# Patient Record
Sex: MALE | Race: WHITE | Employment: OTHER | URBAN - NONMETROPOLITAN AREA
[De-identification: names, ages, dates, MRNs, and addresses within clinical notes are randomized per-mention and may not be internally consistent; named-entity substitution may affect disease eponyms.]

---

## 2019-04-19 ENCOUNTER — HOSPITAL ENCOUNTER (EMERGENCY)
Age: 74
Discharge: HOME | End: 2019-04-19
Attending: EMERGENCY MEDICINE
Payer: COMMERCIAL

## 2019-04-19 VITALS
DIASTOLIC BLOOD PRESSURE: 61 MMHG | RESPIRATION RATE: 16 BRPM | OXYGEN SATURATION: 95 % | HEART RATE: 71 BPM | SYSTOLIC BLOOD PRESSURE: 111 MMHG | TEMPERATURE: 96.6 F

## 2019-04-19 DIAGNOSIS — N30.01 ACUTE CYSTITIS WITH HEMATURIA: ICD-10-CM

## 2019-04-19 DIAGNOSIS — T83.9XXA FOLEY CATHETER PROBLEM, INITIAL ENCOUNTER (HCC): Primary | ICD-10-CM

## 2019-04-19 DIAGNOSIS — R31.9 HEMATURIA, UNSPECIFIED TYPE: ICD-10-CM

## 2019-04-19 LAB
-: ABNORMAL
AMORPHOUS: ABNORMAL
BACTERIA: ABNORMAL
BILIRUBIN URINE: ABNORMAL
CASTS UA: ABNORMAL /LPF (ref 0–2)
COLOR: ABNORMAL
COMMENT UA: ABNORMAL
CRYSTALS, UA: ABNORMAL /HPF
EPITHELIAL CELLS UA: ABNORMAL /HPF (ref 0–5)
GLUCOSE URINE: ABNORMAL
KETONES, URINE: ABNORMAL
LEUKOCYTE ESTERASE, URINE: ABNORMAL
MUCUS: ABNORMAL
NITRITE, URINE: POSITIVE
OTHER OBSERVATIONS UA: ABNORMAL
PH UA: 7 (ref 5–6)
PROTEIN UA: ABNORMAL
RBC UA: >100 /HPF (ref 0–4)
RENAL EPITHELIAL, UA: ABNORMAL /HPF
SPECIFIC GRAVITY UA: 1.01 (ref 1.01–1.02)
TRICHOMONAS: ABNORMAL
TURBIDITY: ABNORMAL
URINE HGB: ABNORMAL
UROBILINOGEN, URINE: ABNORMAL
WBC UA: ABNORMAL /HPF (ref 0–4)
YEAST: ABNORMAL

## 2019-04-19 PROCEDURE — 81001 URINALYSIS AUTO W/SCOPE: CPT

## 2019-04-19 PROCEDURE — 99283 EMERGENCY DEPT VISIT LOW MDM: CPT

## 2019-04-19 PROCEDURE — 87086 URINE CULTURE/COLONY COUNT: CPT

## 2019-04-19 RX ORDER — FUROSEMIDE 20 MG/1
20 TABLET ORAL DAILY
COMMUNITY

## 2019-04-19 RX ORDER — DIGOXIN 125 MCG
125 TABLET ORAL DAILY
COMMUNITY

## 2019-04-19 RX ORDER — AMIODARONE HYDROCHLORIDE 200 MG/1
200 TABLET ORAL DAILY
COMMUNITY

## 2019-04-19 RX ORDER — CEPHALEXIN 500 MG/1
500 CAPSULE ORAL 4 TIMES DAILY
Qty: 28 CAPSULE | Refills: 0 | Status: SHIPPED | OUTPATIENT
Start: 2019-04-19 | End: 2019-04-26

## 2019-04-19 RX ORDER — ATORVASTATIN CALCIUM 40 MG/1
40 TABLET, FILM COATED ORAL DAILY
COMMUNITY

## 2019-04-19 RX ORDER — TAMSULOSIN HYDROCHLORIDE 0.4 MG/1
0.4 CAPSULE ORAL DAILY
COMMUNITY

## 2019-04-19 RX ORDER — EPLERENONE 25 MG/1
25 TABLET, FILM COATED ORAL
COMMUNITY

## 2019-04-19 RX ORDER — CARVEDILOL 25 MG/1
37.5 TABLET ORAL 2 TIMES DAILY WITH MEALS
COMMUNITY

## 2019-04-19 ASSESSMENT — ENCOUNTER SYMPTOMS
CONSTIPATION: 0
BLOOD IN STOOL: 0
WHEEZING: 0
VOMITING: 0
SHORTNESS OF BREATH: 0
ABDOMINAL PAIN: 0
DIARRHEA: 0
BACK PAIN: 0
SORE THROAT: 0
NAUSEA: 0
TROUBLE SWALLOWING: 0

## 2019-04-19 NOTE — ED PROVIDER NOTES
[facility]  eMERGENCY dEPARTMENT eNCOUnter      Pt Name: Ravi Cardenas  MRN: [id number]  [place] 1945  Date of evaluation: 4/19/2019      CHIEF COMPLAINT       Chief Complaint   Patient presents with    Other     urinary catheter problem         HISTORY OF PRESENT ILLNESS    Ravi Cardenas is a 76 y.o. male who presents with a chief complaint of urinary catheter problem he had a recent transurethral prostate resection he said he's been passing some blood but today he noticed decreased urination is passing a few clots he tried to urinate and force without any urinating around the catheter he contacted his urologist in for weight and recommended coming to the ER for irrigation. There's been no fevers chills no nausea no vomiting no cough or shortness of breath he is on no blood thinners he had been on a baby aspirin a day which was stopped for his surgery      REVIEW OF SYSTEMS         Review of Systems   Constitutional: Negative for chills and fever. HENT: Negative for congestion, dental problem, sore throat and trouble swallowing. Eyes: Negative for visual disturbance. Respiratory: Negative for shortness of breath and wheezing. Cardiovascular: Negative for chest pain, palpitations and leg swelling. Gastrointestinal: Negative for abdominal pain, blood in stool, constipation, diarrhea, nausea and vomiting. Genitourinary: Negative for difficulty urinating, dysuria and testicular pain. Arreaga catheter, with blood-tinged urine and clots   Musculoskeletal: Negative for back pain, joint swelling and neck pain. Skin: Negative for rash. Neurological: Negative for dizziness, syncope, weakness and headaches. Hematological: Negative for adenopathy. Does not bruise/bleed easily. Psychiatric/Behavioral: Negative for confusion and suicidal ideas. PAST MEDICAL HISTORY    has a past medical history of Cardiomyopathy ([facility] [address] Diabetes mellitus ([facility] [address] and Hypertension.     SURGICAL HISTORY      has a past surgical history that includes Cardiac defibrillator placement; TURP; and knee surgery. CURRENT MEDICATIONS       Previous Medications    AMIODARONE (CORDARONE) 200 MG TABLET    Take 200 mg by mouth daily    ASPIRIN 81 MG TABLET    Take 81 mg by mouth daily    ATORVASTATIN (LIPITOR) 40 MG TABLET    Take 40 mg by mouth daily    CARVEDILOL (COREG) 25 MG TABLET    Take 37.5 mg by mouth 2 times daily (with meals)    DIGOXIN (LANOXIN) 125 MCG TABLET    Take 125 mcg by mouth daily    EPLERENONE (INSPRA) 25 MG TABLET    Take 25 mg by mouth every 3 days    FUROSEMIDE (LASIX) 20 MG TABLET    Take 20 mg by mouth daily    MAGNESIUM OXIDE (MAG- PO)    Take 1 tablet by mouth daily    MULTIPLE VITAMINS-MINERALS (CENTRUM SILVER PO)    Take by mouth daily    SACUBITRIL-VALSARTAN (ENTRESTO)  MG PER TABLET    Take 1 tablet by mouth 2 times daily    SITAGLIPTIN-METFORMIN HCL -1000 MG TB24    Take 1 tablet by mouth daily    TAMSULOSIN (FLOMAX) 0.4 MG CAPSULE    Take 0.4 mg by mouth daily       ALLERGIES     is allergic to iv contrast [iodides]. FAMILY HISTORY     has no family status information on file. family history is not on file. SOCIAL HISTORY      reports that he has quit smoking. He has never used smokeless tobacco. He reports that he drank alcohol. He reports that he does not use drugs. PHYSICAL EXAM     INITIAL VITALS:  tympanic temperature is 96.6 °F (35.9 °C). His blood pressure is 111/61 and his pulse is 71. His respiration is 16 and oxygen saturation is 95%. Physical Exam   Constitutional: He is oriented to person, place, and time. He appears well-developed and well-nourished. HENT:   Head: Normocephalic and atraumatic. Mouth/Throat: Oropharynx is clear and moist.   Eyes: Pupils are equal, round, and reactive to light. EOM are normal.   Neck: Normal range of motion. Neck supple. Cardiovascular: Normal rate and regular rhythm.    Pulmonary/Chest: Effort normal and breath sounds normal.   Abdominal: Soft. Bowel sounds are normal. He exhibits no distension and no mass. There is no tenderness. There is no rebound and no guarding. Genitourinary: Penis normal.   Genitourinary Comments: She has a Arreaga catheter in place and draining a pink tinged urine is no suprapubic tenderness   Musculoskeletal: Normal range of motion. Neurological: He is alert and oriented to person, place, and time. Skin: Skin is warm and dry. He is not diaphoretic. Psychiatric: He has a normal mood and affect.  His behavior is normal.         DIFFERENTIAL DIAGNOSIS/ MDM:     Hematuria after recent transurethral prostate resection will check the urine for infection and then irrigate to clear the clots    DIAGNOSTIC RESULTS     EKG: All EKG's are interpreted by the Emergency Department Physician who either signs or Co-signs this chart in the absence of a cardiologist.        RADIOLOGY:   I directly visualized the following  images and reviewed the radiologist interpretations:          ED BEDSIDE ULTRASOUND:       LABS:  Labs Reviewed   URINALYSIS - Abnormal; Notable for the following components:       Result Value    Glucose, Ur TRACE (*)     Bilirubin Urine 3+ (*)     Ketones, Urine 1+ (*)     Urine Hgb 3+ (*)     pH, UA 7.0 (*)     Protein, UA 3+ (*)     Urobilinogen, Urine 8 mg/dL (*)     Nitrite, Urine POSITIVE (*)     Leukocyte Esterase, Urine 2+ (*)     All other components within normal limits   MICROSCOPIC URINALYSIS - Abnormal; Notable for the following components:    Bacteria, UA 1+ (*)     Other Observations UA Specimen Cultured (*)     All other components within normal limits   URINE CULTURE     Evidence of urinary tract infection with both the leukocyte esterase and nitrate positive sent for culture      EMERGENCY DEPARTMENT COURSE:   Vitals:    Vitals:    04/19/19 1249   BP: 111/61   Pulse: 71   Resp: 16   Temp: 96.6 °F (35.9 °C)   TempSrc: Tympanic   SpO2: 95% -------------------------  BP: 111/61, Temp: 96.6 °F (35.9 °C), Pulse: 71, Resp: 16        Re-evaluation Notes  Patient resting comfortably Arreaga catheter draining well after irrigation by nursing      CRITICAL CARE:   None        CONSULTS:      PROCEDURES:  None    FINAL IMPRESSION      1. Arreaga catheter problem, initial encounter ([facility] [address]    2. Hematuria, unspecified type    3. Acute cystitis with hematuria          DISPOSITION/PLAN   DISPOSITION Decision To Discharge    Condition on Disposition    Stable    PATIENT REFERRED TO:  Jayna Smith MD  [address] [place], [place] [phone]    In 1 week      Your urologist    In 3 days        DISCHARGE MEDICATIONS:  New Prescriptions    CEPHALEXIN (KEFLEX) 500 MG CAPSULE    Take 1 capsule by mouth 4 times daily for 7 days       (Please note that portions of this note were completed with a voice recognition program.  Efforts were made to edit the dictations but occasionally words are mis-transcribed.)    Bey MD, F.A.A.E.M.   Attending Emergency Physician                          Shonda Thao MD  04/19/19 [address], [place]  04/19/19 [id number]

## 2019-04-20 LAB
CULTURE: NO GROWTH
Lab: NORMAL
SPECIMEN DESCRIPTION: NORMAL

## 2019-04-21 ENCOUNTER — HOSPITAL ENCOUNTER (EMERGENCY)
Age: 74
Discharge: HOME | End: 2019-04-21
Attending: EMERGENCY MEDICINE
Payer: COMMERCIAL

## 2019-04-21 VITALS
OXYGEN SATURATION: 98 % | DIASTOLIC BLOOD PRESSURE: 72 MMHG | SYSTOLIC BLOOD PRESSURE: 113 MMHG | HEART RATE: 78 BPM | WEIGHT: 257 LBS | RESPIRATION RATE: 16 BRPM | TEMPERATURE: 98.1 F

## 2019-04-21 DIAGNOSIS — T83.9XXA FOLEY CATHETER PROBLEM, INITIAL ENCOUNTER (HCC): Primary | ICD-10-CM

## 2019-04-21 PROCEDURE — 99282 EMERGENCY DEPT VISIT SF MDM: CPT

## 2019-04-21 ASSESSMENT — ENCOUNTER SYMPTOMS
BLOOD IN STOOL: 0
CONSTIPATION: 0
SHORTNESS OF BREATH: 0
BACK PAIN: 0
ABDOMINAL PAIN: 0
DIARRHEA: 0
WHEEZING: 0
SORE THROAT: 0
VOMITING: 0
NAUSEA: 0
TROUBLE SWALLOWING: 0

## 2019-04-21 ASSESSMENT — PAIN SCALES - GENERAL
PAINLEVEL_OUTOF10: 1
PAINLEVEL_OUTOF10: 1

## 2019-04-21 NOTE — ED PROVIDER NOTES
[id number] [facility]  eMERGENCY dEPARTMENT eNCOUnter      Pt Name: Tonya Sargent  MRN: [id number]  [place] 1945  Date of evaluation: 4/21/2019      CHIEF COMPLAINT       Chief Complaint   Patient presents with    Urinary Catheter Problem         HISTORY OF PRESENT ILLNESS    Tonya Sargent is a 76 y.o. male who presents with a blocked Arreaga catheter, patient underwent a transurethral prostate resection on Wednesday by Dr. Geovany Larson often he was seen Friday because he developed some bleeding and clotting and had to be irrigated he's done well last night he said he didn't get up and the like and is [place] woke up the bag was completely full since draining it and has not drained any further no fevers no chills no abdominal pain otherwise he feels fine he was diagnosed with a urinary tract infections taking antibiotics      REVIEW OF SYSTEMS         Review of Systems   Constitutional: Negative for chills and fever. HENT: Negative for congestion, dental problem, sore throat and trouble swallowing. Eyes: Negative for visual disturbance. Respiratory: Negative for shortness of breath and wheezing. Cardiovascular: Negative for chest pain, palpitations and leg swelling. Gastrointestinal: Negative for abdominal pain, blood in stool, constipation, diarrhea, nausea and vomiting. Genitourinary: Positive for hematuria. Negative for difficulty urinating, dysuria and testicular pain. Patient has a Arreaga catheter in place   Musculoskeletal: Negative for back pain, joint swelling and neck pain. Skin: Negative for rash. Neurological: Negative for dizziness, syncope, weakness and headaches. Hematological: Negative for adenopathy. Does not bruise/bleed easily. Psychiatric/Behavioral: Negative for confusion and suicidal ideas. PAST MEDICAL HISTORY    has a past medical history of Cardiomyopathy ([facility] [address] Diabetes mellitus ([facility] [address] and Hypertension.     SURGICAL HISTORY      has a past surgical history that place, and time. He appears well-developed and well-nourished. HENT:   Head: Normocephalic and atraumatic. Mouth/Throat: Oropharynx is clear and moist.   Eyes: Pupils are equal, round, and reactive to light. EOM are normal.   Neck: Normal range of motion. Neck supple. Cardiovascular: Normal rate and regular rhythm. Pulmonary/Chest: Effort normal and breath sounds normal.   Abdominal: Soft. Bowel sounds are normal. He exhibits no distension and no mass. There is no tenderness. Genitourinary:   Genitourinary Comments: Patient circumcised she has a Arreaga catheter in place he does have some bloody urine in the bag but there is no active drainage currently   Musculoskeletal: Normal range of motion. Neurological: He is alert and oriented to person, place, and time. Skin: Skin is warm and dry. Psychiatric: He has a normal mood and affect. His behavior is normal.         DIFFERENTIAL DIAGNOSIS/ MDM:     Hematuria status post TURP will irrigate his Arreaga to see if he recluse the clots    DIAGNOSTIC RESULTS     EKG: All EKG's are interpreted by the Emergency Department Physician who either signs or Co-signs this chart in the absence of a cardiologist.        RADIOLOGY:   I directly visualized the following  images and reviewed the radiologist interpretations:          ED BEDSIDE ULTRASOUND:       LABS:  Labs Reviewed - No data to display        EMERGENCY DEPARTMENT COURSE:   Vitals:    Vitals:    04/21/19 0923 04/21/19 1102   BP: 113/72    Pulse: 86 78   Resp: 16 16   Temp: 98.1 °F (36.7 °C)    SpO2: 92% 98%   Weight: 257 lb (116.6 kg)      -------------------------  BP: 113/72, Temp: 98.1 °F (36.7 °C), Pulse: 78, Resp: 16      Re-evaluation Notes    Urine draining now that Arreaga is been flushed large amount of clots were removed    CRITICAL CARE:   None        CONSULTS:      PROCEDURES:  None    FINAL IMPRESSION      1.  Arreaga catheter problem, initial encounter [facility])          DISPOSITION/PLAN   DISPOSITION Decision To Discharge    Condition on Disposition    Stable    PATIENT REFERRED TO:  Matthew Simms, [address] [phone]    Schedule an appointment as soon as possible for a visit in 2 days        DISCHARGE MEDICATIONS:  Discharge Medication List as of 4/21/2019 10:42 AM          (Please note that portions of this note were completed with a voice recognition program.  Efforts were made to edit the dictations but occasionally words are mis-transcribed.)    Roberts MD, F.A.A.E.M.   Attending Emergency Physician                          Arlet Morales MD  04/28/19 2920

## 2025-06-03 ENCOUNTER — APPOINTMENT (OUTPATIENT)
Dept: CT IMAGING | Age: 80
End: 2025-06-03
Payer: MEDICARE

## 2025-06-03 ENCOUNTER — APPOINTMENT (OUTPATIENT)
Dept: GENERAL RADIOLOGY | Age: 80
End: 2025-06-03
Payer: MEDICARE

## 2025-06-03 ENCOUNTER — HOSPITAL ENCOUNTER (EMERGENCY)
Age: 80
Discharge: ANOTHER HOSPITAL | End: 2025-06-03
Attending: SPECIALIST
Payer: MEDICARE

## 2025-06-03 VITALS
TEMPERATURE: 97.7 F | DIASTOLIC BLOOD PRESSURE: 48 MMHG | OXYGEN SATURATION: 94 % | SYSTOLIC BLOOD PRESSURE: 101 MMHG | WEIGHT: 220 LBS | HEIGHT: 70 IN | BODY MASS INDEX: 31.5 KG/M2 | HEART RATE: 70 BPM | RESPIRATION RATE: 16 BRPM

## 2025-06-03 DIAGNOSIS — R55 RECURRENT SYNCOPE: Primary | ICD-10-CM

## 2025-06-03 DIAGNOSIS — I47.29 NONSUSTAINED VENTRICULAR TACHYCARDIA (HCC): ICD-10-CM

## 2025-06-03 LAB
ALBUMIN SERPL-MCNC: 4 G/DL (ref 3.5–5.2)
ALBUMIN/GLOB SERPL: 1.8 {RATIO} (ref 1–2.5)
ALP SERPL-CCNC: 58 U/L (ref 40–129)
ALT SERPL-CCNC: 16 U/L (ref 10–50)
ANION GAP SERPL CALCULATED.3IONS-SCNC: 12 MMOL/L (ref 9–16)
AST SERPL-CCNC: 25 U/L (ref 10–50)
BASOPHILS # BLD: 0.04 K/UL (ref 0–0.2)
BASOPHILS NFR BLD: 1 % (ref 0–2)
BILIRUB SERPL-MCNC: 0.3 MG/DL (ref 0–1.2)
BUN SERPL-MCNC: 30 MG/DL (ref 8–23)
BUN/CREAT SERPL: 25 (ref 9–20)
CALCIUM SERPL-MCNC: 8.7 MG/DL (ref 8.6–10.4)
CHLORIDE SERPL-SCNC: 105 MMOL/L (ref 98–107)
CO2 SERPL-SCNC: 24 MMOL/L (ref 20–31)
CREAT SERPL-MCNC: 1.2 MG/DL (ref 0.7–1.2)
DIGOXIN SERPL-MCNC: 0.3 NG/ML (ref 0.5–2)
EOSINOPHIL # BLD: 0.14 K/UL (ref 0–0.44)
EOSINOPHILS RELATIVE PERCENT: 2 % (ref 1–4)
ERYTHROCYTE [DISTWIDTH] IN BLOOD BY AUTOMATED COUNT: 13.1 % (ref 11.8–14.4)
GFR, ESTIMATED: 61 ML/MIN/1.73M2
GLUCOSE SERPL-MCNC: 137 MG/DL (ref 74–99)
HCT VFR BLD AUTO: 42 % (ref 40.7–50.3)
HGB BLD-MCNC: 14.3 G/DL (ref 13–17)
IMM GRANULOCYTES # BLD AUTO: 0.03 K/UL (ref 0–0.3)
IMM GRANULOCYTES NFR BLD: 0 %
LYMPHOCYTES NFR BLD: 1.72 K/UL (ref 1.1–3.7)
LYMPHOCYTES RELATIVE PERCENT: 23 % (ref 24–43)
MAGNESIUM SERPL-MCNC: 2.3 MG/DL (ref 1.6–2.4)
MCH RBC QN AUTO: 33.1 PG (ref 25.2–33.5)
MCHC RBC AUTO-ENTMCNC: 34 G/DL (ref 25.2–33.5)
MCV RBC AUTO: 97.2 FL (ref 82.6–102.9)
MONOCYTES NFR BLD: 0.81 K/UL (ref 0.1–1.2)
MONOCYTES NFR BLD: 11 % (ref 3–12)
NEUTROPHILS NFR BLD: 63 % (ref 36–65)
NEUTS SEG NFR BLD: 4.77 K/UL (ref 1.5–8.1)
NRBC BLD-RTO: 0 PER 100 WBC
PLATELET # BLD AUTO: 149 K/UL (ref 138–453)
PMV BLD AUTO: 8.3 FL (ref 8.1–13.5)
POTASSIUM SERPL-SCNC: 3.7 MMOL/L (ref 3.7–5.3)
PROT SERPL-MCNC: 6.2 G/DL (ref 6.6–8.7)
RBC # BLD AUTO: 4.32 M/UL (ref 4.21–5.77)
SODIUM SERPL-SCNC: 141 MMOL/L (ref 136–145)
TROPONIN I SERPL HS-MCNC: 14 NG/L (ref 0–22)
TROPONIN I SERPL HS-MCNC: 16 NG/L (ref 0–22)
WBC OTHER # BLD: 7.5 K/UL (ref 3.5–11.3)

## 2025-06-03 PROCEDURE — 70450 CT HEAD/BRAIN W/O DYE: CPT

## 2025-06-03 PROCEDURE — 85025 COMPLETE CBC W/AUTO DIFF WBC: CPT

## 2025-06-03 PROCEDURE — 80162 ASSAY OF DIGOXIN TOTAL: CPT

## 2025-06-03 PROCEDURE — 96361 HYDRATE IV INFUSION ADD-ON: CPT

## 2025-06-03 PROCEDURE — 6360000002 HC RX W HCPCS: Performed by: SPECIALIST

## 2025-06-03 PROCEDURE — 99285 EMERGENCY DEPT VISIT HI MDM: CPT

## 2025-06-03 PROCEDURE — 71045 X-RAY EXAM CHEST 1 VIEW: CPT

## 2025-06-03 PROCEDURE — 2580000003 HC RX 258: Performed by: SPECIALIST

## 2025-06-03 PROCEDURE — 96365 THER/PROPH/DIAG IV INF INIT: CPT

## 2025-06-03 PROCEDURE — 83735 ASSAY OF MAGNESIUM: CPT

## 2025-06-03 PROCEDURE — 80053 COMPREHEN METABOLIC PANEL: CPT

## 2025-06-03 PROCEDURE — 72125 CT NECK SPINE W/O DYE: CPT

## 2025-06-03 PROCEDURE — 70486 CT MAXILLOFACIAL W/O DYE: CPT

## 2025-06-03 PROCEDURE — 93005 ELECTROCARDIOGRAM TRACING: CPT | Performed by: SPECIALIST

## 2025-06-03 PROCEDURE — 36415 COLL VENOUS BLD VENIPUNCTURE: CPT

## 2025-06-03 PROCEDURE — 96366 THER/PROPH/DIAG IV INF ADDON: CPT

## 2025-06-03 PROCEDURE — 84484 ASSAY OF TROPONIN QUANT: CPT

## 2025-06-03 RX ORDER — BUPROPION HYDROCHLORIDE 300 MG/1
300 TABLET ORAL DAILY
COMMUNITY
Start: 2025-02-10 | End: 2025-08-09

## 2025-06-03 RX ORDER — TRAZODONE HYDROCHLORIDE 150 MG/1
150 TABLET ORAL NIGHTLY
COMMUNITY
Start: 2024-11-25 | End: 2025-08-22

## 2025-06-03 RX ORDER — SITAGLIPTIN AND METFORMIN HYDROCHLORIDE 1000; 100 MG/1; MG/1
1 TABLET, FILM COATED, EXTENDED RELEASE ORAL NIGHTLY
COMMUNITY

## 2025-06-03 RX ORDER — 0.9 % SODIUM CHLORIDE 0.9 %
250 INTRAVENOUS SOLUTION INTRAVENOUS ONCE
Status: COMPLETED | OUTPATIENT
Start: 2025-06-03 | End: 2025-06-03

## 2025-06-03 RX ORDER — SPIRONOLACTONE 25 MG/1
25 TABLET ORAL DAILY
COMMUNITY
Start: 2024-09-18

## 2025-06-03 RX ADMIN — AMIODARONE HYDROCHLORIDE 150 MG: 50 INJECTION, SOLUTION INTRAVENOUS at 06:47

## 2025-06-03 RX ADMIN — AMIODARONE HYDROCHLORIDE 1 MG/MIN: 50 INJECTION, SOLUTION INTRAVENOUS at 07:05

## 2025-06-03 RX ADMIN — SODIUM CHLORIDE 250 ML: 0.9 INJECTION, SOLUTION INTRAVENOUS at 05:21

## 2025-06-03 ASSESSMENT — PAIN - FUNCTIONAL ASSESSMENT
PAIN_FUNCTIONAL_ASSESSMENT: NONE - DENIES PAIN
PAIN_FUNCTIONAL_ASSESSMENT: NONE - DENIES PAIN

## 2025-06-03 NOTE — ED PROVIDER NOTES
[facility] Emergency Department  [address] [facility] [address]  Phone: [phone]      Patient Name:  Giorgio Villalobos  Medical Record Number:  [id number]  YOB: 1945  Date of Service:  6/3/2025  Primary Care Physician:  Taz Breaux MD    Care of this patient was assumed from Dr Posada.  The patient was seen for Fall (Episodes of dizziness, fell x2 today. C-collar not in place on arrival. ) and Dizziness  .  The patient's initial evaluation and plan have been discussed with the prior provider who initially evaluated the patient.  Past medical history, past surgical history, medications and allergies were all reviewed.     CURRENT MEDICATIONS:      Current Discharge Medication List        CONTINUE these medications which have NOT CHANGED    Details   traZODone (DESYREL) 150 MG tablet Take 1 tablet by mouth nightly      spironolactone (ALDACTONE) 25 MG tablet Take 1 tablet by mouth daily      empagliflozin (JARDIANCE) 25 MG tablet Take 1 tablet by mouth daily      buPROPion (WELLBUTRIN XL) 300 MG extended release tablet Take 1 tablet by mouth daily      ASPIRIN 81 PO Take 81 mg by mouth daily      !! SITaglip Phos-metFORMIN HCl ER (JANUMET XR) 100-1000 MG TB24 Take 1 tablet by mouth nightly      !! SITaglip Phos-metFORMIN HCl ER (JANUMET XR) 100-1000 MG TB24 Take 1 tablet by mouth nightly      amiodarone (CORDARONE) 200 MG tablet Take 0.5 tablets by mouth daily      furosemide (LASIX) 20 MG tablet Take 1 tablet by mouth daily      sacubitril-valsartan (ENTRESTO)  MG per tablet Take 1 tablet by mouth 2 times daily      atorvastatin (LIPITOR) 40 MG tablet Take 1 tablet by mouth daily      Magnesium Oxide (MAG- PO) Take 1 tablet by mouth daily      carvedilol (COREG) 25 MG tablet Take 37.5 mg by mouth 2 times daily (with meals)      Multiple Vitamins-Minerals (CENTRUM SILVER PO) Take by mouth daily      digoxin (LANOXIN) 125 MCG tablet Take 1 tablet by mouth daily      eplerenone 
Filt Rate 61 >60 mL/min/1.73m2    BUN/Creatinine Ratio 25 (H) 9 - 20    Calcium 8.7 8.6 - 10.4 mg/dL    Total Protein 6.2 (L) 6.6 - 8.7 g/dL    Albumin 4.0 3.5 - 5.2 g/dL    Albumin/Globulin Ratio 1.8 1.0 - 2.5    Total Bilirubin 0.3 0.0 - 1.2 mg/dL    Alkaline Phosphatase 58 40 - 129 U/L    ALT 16 10 - 50 U/L    AST 25 10 - 50 U/L   Troponin   Result Value Ref Range    Troponin, High Sensitivity 14 0 - 22 ng/L   Digoxin Level   Result Value Ref Range    Digoxin Lvl 0.3 (L) 0.5 - 2.0 ng/mL   Troponin   Result Value Ref Range    Troponin, High Sensitivity 16 0 - 22 ng/L   Magnesium   Result Value Ref Range    Magnesium 2.3 1.6 - 2.4 mg/dL   EKG 12 Lead   Result Value Ref Range    Ventricular Rate 81 BPM    Atrial Rate 78 BPM    P-R Interval 268 ms    QRS Duration 148 ms    Q-T Interval 538 ms    QTc Calculation (Bazett) 624 ms    P Axis 53 degrees    R Axis -84 degrees    T Axis 51 degrees       DEPARTMENT VITAL SIGNS:     Vitals:    06/03/25 0415 06/03/25 0430 06/03/25 0530 06/03/25 0545   BP: (!) 110/31 (!) 114/58 (!) 92/47 (!) 92/54   Pulse: 77 88 77 73   Resp:       Temp:       TempSrc:       SpO2: 96%  94% 94%   Weight:       Height:         BP: (!) 92/54, Temp: 97.7 °F (36.5 °C), Pulse: 73, Respirations: 16     EMERGENCY DEPARTMENT COURSE:    During the emergency department course, patient was put on the monitor, saline Hep-Lock was started and patient was given 250 mL bolus.    ICD analysis was done by [employer] representative during the emergency department stay.  Patient has had syncopal episodes at 5 PM and again at midnight.  He has had ventricular tachycardia at both times, first terminated with antitachycardia pacemaker.  The second episode, ATP was unsuccessful and third burst accelerated too fast V. tach which was shocked at 31 J and it was successful.  Patient has had several ATP's for V. tach at 160s to 170s range in recent months.  Pacemaker since within normal limits.  No changes were recommended

## 2025-06-05 LAB
EKG ATRIAL RATE: 78 BPM
EKG P AXIS: 53 DEGREES
EKG P-R INTERVAL: 268 MS
EKG Q-T INTERVAL: 538 MS
EKG QRS DURATION: 148 MS
EKG QTC CALCULATION (BAZETT): 624 MS
EKG R AXIS: -84 DEGREES
EKG T AXIS: 51 DEGREES
EKG VENTRICULAR RATE: 81 BPM

## 2025-06-12 LAB
EKG ATRIAL RATE: 77 BPM
EKG P AXIS: 68 DEGREES
EKG P-R INTERVAL: 208 MS
EKG Q-T INTERVAL: 478 MS
EKG QRS DURATION: 140 MS
EKG QTC CALCULATION (BAZETT): 544 MS
EKG R AXIS: -85 DEGREES
EKG T AXIS: 68 DEGREES
EKG VENTRICULAR RATE: 78 BPM